# Patient Record
Sex: MALE | Race: BLACK OR AFRICAN AMERICAN | NOT HISPANIC OR LATINO | URBAN - METROPOLITAN AREA
[De-identification: names, ages, dates, MRNs, and addresses within clinical notes are randomized per-mention and may not be internally consistent; named-entity substitution may affect disease eponyms.]

---

## 2018-08-30 ENCOUNTER — COMMUNICATION - HEALTHEAST (OUTPATIENT)
Dept: TELEHEALTH | Facility: CLINIC | Age: 38
End: 2018-08-30

## 2018-08-30 ENCOUNTER — OFFICE VISIT - HEALTHEAST (OUTPATIENT)
Dept: FAMILY MEDICINE | Facility: CLINIC | Age: 38
End: 2018-08-30

## 2018-08-30 DIAGNOSIS — Z76.89 ENCOUNTER TO ESTABLISH CARE: ICD-10-CM

## 2018-08-30 DIAGNOSIS — Z23 NEED FOR VACCINATION: ICD-10-CM

## 2018-08-30 DIAGNOSIS — L02.412 ABSCESS OF AXILLA, LEFT: ICD-10-CM

## 2018-08-30 ASSESSMENT — MIFFLIN-ST. JEOR: SCORE: 1809.81

## 2018-10-29 ENCOUNTER — OFFICE VISIT - HEALTHEAST (OUTPATIENT)
Dept: FAMILY MEDICINE | Facility: CLINIC | Age: 38
End: 2018-10-29

## 2018-10-29 DIAGNOSIS — Z86.711 HISTORY OF PULMONARY EMBOLISM: ICD-10-CM

## 2018-10-30 ENCOUNTER — COMMUNICATION - HEALTHEAST (OUTPATIENT)
Dept: ANTICOAGULATION | Facility: CLINIC | Age: 38
End: 2018-10-30

## 2018-10-30 DIAGNOSIS — Z86.711 HISTORY OF PULMONARY EMBOLISM: ICD-10-CM

## 2018-11-21 ENCOUNTER — COMMUNICATION - HEALTHEAST (OUTPATIENT)
Dept: ANTICOAGULATION | Facility: CLINIC | Age: 38
End: 2018-11-21

## 2018-11-21 DIAGNOSIS — Z86.711 HISTORY OF PULMONARY EMBOLISM: ICD-10-CM

## 2018-12-05 ENCOUNTER — COMMUNICATION - HEALTHEAST (OUTPATIENT)
Dept: ANTICOAGULATION | Facility: CLINIC | Age: 38
End: 2018-12-05

## 2018-12-05 DIAGNOSIS — Z86.711 HISTORY OF PULMONARY EMBOLISM: ICD-10-CM

## 2019-01-02 ENCOUNTER — COMMUNICATION - HEALTHEAST (OUTPATIENT)
Dept: ANTICOAGULATION | Facility: CLINIC | Age: 39
End: 2019-01-02

## 2019-01-02 DIAGNOSIS — Z86.711 HISTORY OF PULMONARY EMBOLISM: ICD-10-CM

## 2019-01-11 ENCOUNTER — COMMUNICATION - HEALTHEAST (OUTPATIENT)
Dept: ANTICOAGULATION | Facility: CLINIC | Age: 39
End: 2019-01-11

## 2019-01-28 ENCOUNTER — COMMUNICATION - HEALTHEAST (OUTPATIENT)
Dept: ANTICOAGULATION | Facility: CLINIC | Age: 39
End: 2019-01-28

## 2019-01-29 ENCOUNTER — OFFICE VISIT - HEALTHEAST (OUTPATIENT)
Dept: FAMILY MEDICINE | Facility: CLINIC | Age: 39
End: 2019-01-29

## 2019-01-29 DIAGNOSIS — M87.00 AVASCULAR NECROSIS (H): ICD-10-CM

## 2019-01-29 DIAGNOSIS — Z86.711 HISTORY OF PULMONARY EMBOLISM: ICD-10-CM

## 2019-06-12 ENCOUNTER — COMMUNICATION - HEALTHEAST (OUTPATIENT)
Dept: FAMILY MEDICINE | Facility: CLINIC | Age: 39
End: 2019-06-12

## 2019-08-08 ENCOUNTER — COMMUNICATION - HEALTHEAST (OUTPATIENT)
Dept: FAMILY MEDICINE | Facility: CLINIC | Age: 39
End: 2019-08-08

## 2020-07-28 ENCOUNTER — COMMUNICATION - HEALTHEAST (OUTPATIENT)
Dept: CARE COORDINATION | Facility: CLINIC | Age: 40
End: 2020-07-28

## 2021-06-02 VITALS — HEIGHT: 72 IN | BODY MASS INDEX: 26.03 KG/M2 | WEIGHT: 192.2 LBS

## 2021-06-02 VITALS — WEIGHT: 193.5 LBS | BODY MASS INDEX: 26.24 KG/M2

## 2021-06-02 VITALS — WEIGHT: 194.44 LBS | BODY MASS INDEX: 26.37 KG/M2

## 2021-06-10 NOTE — PROGRESS NOTES
Clinic Care Coordination Contact    Situation: external discharge report    Background: Urgent Care 7/26/20 for unprotected sex.  Geetha Hearn FNP listed at PCP    Assessment: Patient has not seen [facility] since 1/2019.  Since then numerous ED visits and 1 hospital admission.  No f/u with [facility] after any of these visits.    Plan/Recommendations: No CCC Outreach at this time.      PCP to send referral if patient follows up with PCP

## 2021-06-16 PROBLEM — M62.82 RHABDOMYOLYSIS: Status: ACTIVE | Noted: 2018-03-19

## 2021-06-16 PROBLEM — M87.00 AVASCULAR NECROSIS (H): Status: ACTIVE | Noted: 2018-10-06

## 2021-06-16 PROBLEM — J45.20 MILD INTERMITTENT ASTHMA WITHOUT COMPLICATION: Chronic | Status: ACTIVE | Noted: 2018-03-19

## 2021-06-16 PROBLEM — Z86.711 HISTORY OF PULMONARY EMBOLISM: Status: ACTIVE | Noted: 2018-03-20

## 2021-06-16 PROBLEM — F17.200 TOBACCO USE DISORDER: Chronic | Status: ACTIVE | Noted: 2018-03-19

## 2021-06-19 NOTE — LETTER
Letter by Nadiya Iniguez FNP at      Author: Nadiya Iniguez FNP Service: -- Author Type: --    Filed:  Encounter Date: 8/8/2019 Status: (Other)        Ruben Almeida  [address] [place] [address]             August 8, 2019         Dear Ruben Almeida,        It appears that you are due for an asthma recheck appointment. Please call to get this set up.         Please call with questions or contact us using [employer].    Sincerely,        Electronically signed by DEJA Torres

## 2021-06-20 NOTE — PROGRESS NOTES
ASSESSMENT:  1. Encounter to establish care  2. Abscess of axilla, left  1. Discussed appropriate home care of this wound., Dispensed dressing supplies and instructions on their use., Wound packed., Wound redressed., Antibiotics per orders.  2. Patient instructions were given.  3. Follow up: as needed.     3. Need for vaccination  Complete needed vaccination  - Tdap vaccine,  8yo or older,  IM      I have reconciled all medications with attention to the pre-hospital regimen.   I have instructed patient in self-management  I have explained warning signs and how to respond  I provided instructions for seeking emergency and non-emergency after-hour cares    PLAN:  There are no Patient Instructions on file for this visit.    Orders Placed This Encounter   Procedures     Tdap vaccine,  8yo or older,  IM     Medications Discontinued During This Encounter   Medication Reason     oxyCODONE-acetaminophen (PERCOCET) 5-325 mg per tablet      rivaroxaban 15 mg (42)- 20 mg (9) DsPk        CHIEF COMPLAINT:  Chief Complaint   Patient presents with     Follow-up     abcess under left arm   see note-  pt in pain     Establish Care       HISTORY OF PRESENT ILLNESS:  Ruben is a 38 y.o. male presenting to the clinic today for a ED follow up. He has past history of PE, DVT, TOREY, and abuse of illicit drugs. He works at [employer] and at night he works at [employer]. He presented to the ED for evaluation of abscess in his left axilla. The patient reported a week of left axillary pain and lump formation. After discussing risks and benefits of incision and drainage. A large amount of purulent material was drained.  Patient was discharged on doxycycline and was asked to follow-up in clinic to establish primary care and for packing change. He reports that he is yet to  the prescribed medication from the ED and complain of mild to moderate pain.    During the days  REVIEW OF SYSTEMS:   All other systems are negative.    PFSH:  Past  Medical History:   Diagnosis Date     History of pulmonary embolism 3/20/2018     Leg DVT (deep venous thromboembolism), acute (H) 2017     Mild intermittent asthma without complication 3/19/2018     Pulmonary embolus (H) 2017     Tobacco use disorder 03/19/2018     Past Surgical History:   Procedure Laterality Date     APPENDECTOMY       ORIF RADIUS & ULNA FRACTURES Left 2001     Family History   Problem Relation Age of Onset     Breast cancer Sister      Diabetes Sister      Asthma Daughter        Allergies   Allergen Reactions     Ibuprofen Other (See Comments)     Abdominal pain       TOBACCO USE:  History   Smoking Status     Current Every Day Smoker     Packs/day: 0.50   Smokeless Tobacco     Never Used       VITALS:  Vitals:    08/30/18 1024   BP: 130/74   Pulse: 72   Temp: 98.6  F (37  C)   TempSrc: Oral   SpO2: 98%   Weight: 192 lb 3.2 oz (87.2 kg)   Height: 6' (1.829 m)     Wt Readings from Last 3 Encounters:   08/30/18 192 lb 3.2 oz (87.2 kg)   08/28/18 200 lb (90.7 kg)   08/03/18 (!) 230 lb (104.3 kg)     Body mass index is 26.07 kg/(m^2).    PHYSICAL EXAM:  Physical Examination: General appearance - alert, well appearing, and in no distress  Eyes: pupils equal and reactive, extraocular eye movements intact  Neurological: alert, oriented, normal speech, no focal findings or movement disorder noted  Extremities: No edema, no clubbing or cyanosis  Psychiatric: Normal affect. Does not appear anxious or depressed.  Wound:   wound margins intact and healing well.  No signs of infection.  serous exudate noted        QUALITY MEASURES:  I have counseled the patient for tobacco cessation and the follow up will occur  at the next visit.    MEDICATIONS:  Current Outpatient Prescriptions   Medication Sig Dispense Refill     albuterol (PROAIR HFA;PROVENTIL HFA;VENTOLIN HFA) 90 mcg/actuation inhaler Inhale 2 puffs every 4 (four) hours as needed for wheezing. 1 Inhaler 0     doxycycline (VIBRAMYCIN) 100 MG capsule Take  1 capsule (100 mg total) by mouth 2 (two) times a day for 10 days. 20 capsule 0     HYDROcodone-acetaminophen 5-325 mg per tablet Take 1 tablet by mouth every 6 (six) hours as needed for pain. 8 tablet 0     No current facility-administered medications for this visit.

## 2021-06-21 NOTE — PROGRESS NOTES
Office Visit - Follow Up   Ruben Almeida   38 y.o. male    Date of Visit: 10/29/2018    Chief Complaint   Patient presents with     Follow-up     Hospital 10/26        Assessment and Plan   1. History of pulmonary embolism  I informed patient that warfarin could be an alternative since warfarin is cheaper than Xarelto but he will need to come in weekly or biweekly to have his INR checked.  Patient would like to go with warfarin.  Patient will continue with Xarelto for another 2 weeks before starting warfarin.  I did consult with [facility] pharmacy on how to proceed to wean off Xarelto while starting warfarin.  Discussed the need to see hematology, and pulmonologist but this will require health insurance.  - INR; Future  - Ambulatory referral to Anticoagulation Monitoring    I have counseled the patient for tobacco cessation and the follow up will occur  at the next visit.       History of Present Illness   This 38 y.o. old male patient with history of tobacco use disorder, mild intermittent asthma without complication, rhabdomyolysis, pulmonary embolism and a vascular necrosis who presented to the clinic today for follow-up after being seen at the emergency department for chest pain and shortness of breath.  Patient reported that a few months ago he moved from [place] to [place].  He stated that while in [place] he was diagnosed with DVT and pulmonary embolism and he was placed on Coumadin and when he arrived in [place] he had chest pain and he was seen in the ED and it was confirmed that he does have a history of PE and he was prescribed Xarelto patient did not  Xarelto because of the cost.  A month later he was back in the ED with chest pain and shortness of breath. D-dimer was negative.  CT chest shows chronic pulmonary embolism. EKG and troponin x2 was negative. Patient had complete resolution of pain while in the emergency department. Patient was informed that he needed $104 to get complete health insurance  in the [facility]. Patient was given 30 day supply worth of medications.  Patient is here today with his girlfriend stating that he is unable to afford the medication.  He denied chest pain, shortness of breath, fever or chills and syncope.    Review of Systems: A comprehensive review of systems was negative except as noted.     Medications, Allergies and Problem List   Reviewed and updated     Physical Exam   General Appearance: well groomed    /72 (Patient Site: Right Arm, Patient Position: Sitting, Cuff Size: Adult Regular)  Pulse 87  Wt 194 lb 7 oz (88.2 kg)  SpO2 98%  BMI 26.37 kg/m2       Additional Information   Current Outpatient Prescriptions   Medication Sig Dispense Refill     albuterol (PROAIR HFA;PROVENTIL HFA;VENTOLIN HFA) 90 mcg/actuation inhaler Inhale 1-2 puffs every 4 (four) hours as needed for wheezing. 1 Inhaler 0     rivaroxaban (XARELTO) 20 mg Tab Take 1 tablet (20 mg total) by mouth daily. After starter pack. 30 tablet 0     rivaroxaban 15 mg (42)- 20 mg (9) DsPk Take 1 tablet (15mg) po bid for 21 days then take 1 tablet (20mg) daily 51 tablet 0     No current facility-administered medications for this visit.      Allergies   Allergen Reactions     Ibuprofen Other (See Comments)     Abdominal pain     Social History   Substance Use Topics     Smoking status: Current Every Day Smoker     Packs/day: 0.50     Smokeless tobacco: Never Used     Alcohol use No     Revealed CT results and confirmed PEs and pulmonary nodules.    Time: total time spent with the patient was 15 minutes of which >50% was spent in counseling and coordination of care     Nadiya Iniguez, CNP

## 2021-06-22 NOTE — TELEPHONE ENCOUNTER
Who is calling:  Patient  Reason for Call:  Patient returning phone call asking to speak with Chema Maxwell, regarding paperwork. Please follow up with patient. Thank you!   Date of last appointment with primary care: none  Has the patient been recently seen:  No  Okay to leave a detailed message: Yes

## 2021-06-22 NOTE — TELEPHONE ENCOUNTER
"Anticoagulation    Reviewed patient with Nadiya Iniguez NP. Okay to restart Xarelto. 30 day rx. Recommend office visit for med review and follow up on ED visit for seizures.    Relayed to Ruben and provided educations:      Discussed bringing [employer] card to [facility] to obtain prescription, Discussed short onset/offset of Xarelto and importance of compliance. Risk of clots with missed doses.       Discussed taking Xarelto only once daily with largest meal at same time each day.       Reviewed monitoring for s/sx of bleeding and when to seek medical attention including to ED for falls that involve hitting head and/or \"worst headache of life\"     Ruben verbalizes understanding to restart Xarelto and to scheduled follow up as soon as possible with GUY Mcclellan, PharmD      "

## 2021-06-22 NOTE — TELEPHONE ENCOUNTER
Patient Returning Call  Reason for call:  Patient returned missed call  Information relayed to patient:  Please call patient back when available.  Patient has additional questions:  No  If YES, what are your questions/concerns:  n/a  Okay to leave a detailed message?: Yes

## 2021-06-22 NOTE — TELEPHONE ENCOUNTER
Left message for Ruben requesting call back to follow up on Neurology visit and anticoagulation status. See new encounter 1/2/19    Alissa Chow, PharmD

## 2021-06-22 NOTE — TELEPHONE ENCOUNTER
Anticoagulation    Ruben with recent PE 10/26/18. Initially started on Xarelto. Ruben without insurance and unable to afford novel anticoagulant or Lovenox bridge to transition to Xarelto. Applied to [employer] assistance program for Xarelto coverage 11/21/18.    Has received Apixaban this past month while awaiting [employer] patient assistance application for  Xarelto assistance. Ruben now recently approved for [employer] assistance for Xarelto.     On follow up 12/31, Elian reported he was at [facility] ED over the weekend due to seizure and fall hitting his head. Ruben was not hospitalized but was asked to follow up with neurology on discharge. He was unsure of CT results or if it was okay to continue anticoagulation and was not able to identify instructions on discharge paper work. Discussed with covering primary provider, Erma Mortensen NP and Ruben was instructed to hold anticoagulation until he could get his CT results and/or clearance from Neuro.     Ruben called back today 1/3/18 to report he was unable to see Neuro as he was unable to afford $150 up front for visit. [facility] to review [facility] ED visit.  Upon chart review  [facility] is now is sharing records with .     12/30/18 CT conclusion:  Within limits of mild motion, no acute intracranial hemorrhage, intracranial mass, recent infarct or calvarial fracture    [place] Discharge instructions:    An Monae MD - 12/30/2018    You were seen in the ED after having a seizure. You also had a CT scan of your head since you hit it, this did not show any brain bleeds or other injuries. We recommend follow-up in clinic with Neurology. You should refrain from alcohol and drug use as this can cause or worsen your seizure disorder.         Additional notes from record review:  Using alcohol (level 0.15 in ED)and Marijuana prior to seizure. Patient does report single previous seizure years ago.  Never on anti-epileptic medications.    ________________    Assessment/Plan:     CT clear of bleed. Consider restarting anticoagulation if okay with PCP since within initial 3 months of clot. Ruben set up to have Xarelto covered by [employer].  Reassess duration of anticoagulation with findings of seizure and further discussion of frequency and quantity of alcohol use/fall risk.    Determine further care of seizures with PCP    Alissa Chow, PharmD

## 2021-06-22 NOTE — TELEPHONE ENCOUNTER
Left message for Ruben that [facility] sent ED visit. No paper work/[facility]Everywhere paperwork needed at this time. Will return call to him after I have reviewed plan with his PCP, Nadiya Mckay.    Alissa Chow, PharmD

## 2021-06-23 NOTE — TELEPHONE ENCOUNTER
Anticoagulation    Followed up with Ruben. He obtained his Xarelto without problem. Tolerating it better than Eliquis. When asked about compliance/missed doses-reports one late dose he took right away upon awakening.     Follow up appointment with PCP not yet scheduled for anticoagulation follow up for further refills and follow up to ED visit for seizure; arranged scheduling for visit 1/21/19.    Alissa Chow, PharmD

## 2021-06-23 NOTE — TELEPHONE ENCOUNTER
Spoke to Ruben and reminded him that he is to see Marco A Iniguez NP for follow up on anticoagulation/[facility] ED visit for seizure in order to receive additional Xarelto refills.      Ruben agreed and scheduled Apt 1/29    Alissa Chow, PharmD

## 2021-06-23 NOTE — PROGRESS NOTES
Office Visit - Follow Up   Ruben Almeida   38 y.o. male    Date of Visit: 1/29/2019    Chief Complaint   Patient presents with     Follow Up        Assessment and Plan   1. Avascular necrosis (H)  Patient reported that pain is manageable.  He is looking forward to see an orthopedic specialist for evaluation for possible surgery as soon as he gets his medical insurance.    2. History of pulmonary embolism  Patient has continued to take his medication as prescribed.   - rivaroxaban (XARELTO) 20 mg Tab; Take 1 tablet (20 mg total) by mouth daily with supper.  Dispense: 30 tablet; Refill: 11    The following high BMI interventions were performed this visit: encouragement to exercise and dietary management education, guidance, and counseling    No Follow-up on file.     History of Present Illness   This 38 y.o. old male patient past medication of pulmonary embolism, Rhabdomyolysis and Avascular necrosis presents to the clinic for a follow up. He reports that he has continued to take his Xarelto but was informed that he needed to follow up with his PCP to continue receiving prescription for his Xarelto. Patient reports that he was also at the ED few days ago due to the flare up of his avascular necrosis. He was given lidocaine patch which helped with pain. He will like to see an orthopedic specialist for possible surgery as he was informed when he was in [place] that he will be needing surgery. Patient does not have insurance now. He will proceed as soon as he gets insurance.     Review of Systems: A comprehensive review of systems was negative except as noted.     Medications, Allergies and Problem List   Reviewed and updated     Physical Exam   General Appearance:   Well groomed    /70 (Patient Site: Right Arm, Patient Position: Sitting, Cuff Size: Adult Regular)   Pulse 88   Wt 193 lb 8 oz (87.8 kg)   SpO2 98%   BMI 26.24 kg/m         Additional Information   Current Outpatient Medications   Medication Sig  Dispense Refill     albuterol (PROAIR HFA;PROVENTIL HFA;VENTOLIN HFA) 90 mcg/actuation inhaler Inhale 1-2 puffs every 4 (four) hours as needed for wheezing. 1 Inhaler 0     rivaroxaban (XARELTO) 20 mg Tab Take 1 tablet (20 mg total) by mouth daily with supper. 30 tablet 11     HYDROcodone-acetaminophen 5-325 mg per tablet Take 1 tablet by mouth every 4 (four) hours as needed for pain. 6 tablet 0     No current facility-administered medications for this visit.      Allergies   Allergen Reactions     Ibuprofen Other (See Comments)     Abdominal pain     Social History     Tobacco Use     Smoking status: Current Every Day Smoker     Packs/day: 0.50     Smokeless tobacco: Never Used   Substance Use Topics     Alcohol use: No     Drug use: Yes     Types: Marijuana     Comment: 2 per day, one in the morning, and one at night       Reviewed last visit to the ED and medications.     Time: total time spent with the patient was 15 minutes of which >50% was spent in counseling and coordination of care     Nadiya Iniguez, CNP

## 2021-10-09 NOTE — LETTER
Letter by Nadiya Iniguez FNP at      Author: Nadiya Iniguez FNP Service: -- Author Type: --    Filed:  Encounter Date: 6/12/2019 Status: (Other)         Ruben Almeida  [address] [place] [address]             June 12, 2019         Dear Mr. Almeida,    We are reviewing records and it shows that you have not completed a Asthma Control Test within the past year.  These are filled out for patients that have or have had a diagnosis of asthma or or were prescribed an inhaler for another reason.  If you do not currently have asthma, we would still appreciate you filling the form out and mailing it back to us.      If you have any questions, please feel free to message us through my chart or call the clinic at [phone].    Please call with questions or contact us using [employer].    Sincerely,        Electronically signed by DEJA Torres        Normal thyroid